# Patient Record
Sex: MALE | Race: WHITE | NOT HISPANIC OR LATINO | Employment: OTHER | URBAN - METROPOLITAN AREA
[De-identification: names, ages, dates, MRNs, and addresses within clinical notes are randomized per-mention and may not be internally consistent; named-entity substitution may affect disease eponyms.]

---

## 2023-01-05 PROBLEM — I63.9 ACUTE ISCHEMIC STROKE: Status: ACTIVE | Noted: 2023-01-05

## 2023-01-05 PROBLEM — I10 PRIMARY HYPERTENSION: Status: ACTIVE | Noted: 2023-01-05

## 2023-01-05 PROBLEM — Z71.89 ACP (ADVANCE CARE PLANNING): Status: ACTIVE | Noted: 2023-01-05

## 2023-01-06 PROBLEM — E11.9 TYPE 2 DIABETES MELLITUS WITHOUT COMPLICATION, WITHOUT LONG-TERM CURRENT USE OF INSULIN: Status: ACTIVE | Noted: 2023-01-06

## 2023-01-06 PROBLEM — E78.2 MIXED HYPERLIPIDEMIA: Status: ACTIVE | Noted: 2023-01-06

## 2023-04-10 PROBLEM — I63.9 ACUTE ISCHEMIC STROKE: Status: RESOLVED | Noted: 2023-01-05 | Resolved: 2023-04-10

## 2023-07-11 DIAGNOSIS — Z01.89 ENCOUNTER FOR OTHER SPECIFIED SPECIAL EXAMINATIONS: Primary | ICD-10-CM

## 2023-12-07 ENCOUNTER — TELEPHONE (OUTPATIENT)
Dept: DERMATOLOGY | Facility: CLINIC | Age: 83
End: 2023-12-07
Payer: OTHER GOVERNMENT

## 2023-12-07 NOTE — TELEPHONE ENCOUNTER
----- Message from Zuleika Zavala sent at 12/7/2023  9:23 AM CST -----  Type:  Sooner Appointment Request    Caller is requesting a sooner appointment.  Caller declined first available appointment listed below.  Caller will not accept being placed on the waitlist and is requesting a message be sent to doctor.    Name of Caller:  pt  When is the first available appointment?  NA   Symptoms:  previous skin cancer encounters/ concerns with new spots annual skin check   Would the patient rather a call back or a response via [facility]? Call back   Best Call Back Number:  [phone]    Additional Information:  pt stated he would like to be advised asap in regards to getting an appt and what is needed for pt to bandar asap pt has concerns with previous skin cancer encounters and raised concerns with new spots on pt's face and hand please call pt back to advise and bandar asap thanks!